# Patient Record
(demographics unavailable — no encounter records)

---

## 2025-04-10 NOTE — PLAN
[FreeTextEntry1] : We reviewed the risks and benefits of observation vs repair.  Discussed risks of lap vs open, robotic assisted inguinal hernia repair with mesh, including but not limited to bleeding, infection, recurrence, injury to nearby structures, and nerve pain. We reviewed the typical tari-op and post-op course including bloating, swelling, bruising and pain. I expect the patient will be able to go home the same day. All questions were answered, and the patient wishes to be scheduled for surgery.  For more information on surgery:  [email or web address]    We will ask for pre-op to clinic to assess him and determine if more w/u is needed regarding cardiac hx.

## 2025-04-10 NOTE — ASSESSMENT
[FreeTextEntry1] : Mr. Lozano has bilateral inguinal hernias Right > left., reducible. Today we discussed the treatment options including hernia repair versus watchful waiting.  The patient is interested in repair.  We reviewed options for laparoscopic, robotic assisted versus open repair.  We reviewed options for unilateral versus bilateral repair.  I have recommended lap robotic assisted mesh bilateral repair.  He has a history of open hernia repair as a baby though I could not see the scar. This may increase risk of some adhesions or need for open but not too likely.   I discussed the risks and benefits including, but not limited to, injury to intra-abdominal organs (bowel, bladder, nerves, spermatic cord/round ligament), bleeding, infection, use of mesh and recurrent hernia, and urinary retention. We discussed the role and complications of mesh at length. I also discussed the normal tari- and post-operative course and the possibility of postoperative chronic pain. I answered all questions about this surgery and possible complications to the best of my ability and the patient verbalized understanding. Should unrelenting symptoms or signs of incarceration develop prior to surgery the patient knows to call or go to the emergency room.

## 2025-04-10 NOTE — PHYSICAL EXAM
[JVD] : no jugular venous distention  [Respiratory Effort] : normal respiratory effort [No Rash or Lesion] : No rash or lesion [Alert] : alert [Oriented to Person] : oriented to person [Oriented to Place] : oriented to place [Oriented to Time] : oriented to time [Calm] : calm [de-identified] : well NAD [de-identified] : NCAT [de-identified] : soft, non distended, RIH, reducible no skin changes, small, left side with small palpable defect as well. [de-identified] : no c/c/e

## 2025-04-10 NOTE — REVIEW OF SYSTEMS
[TextEntry] : rare  frequency.  Constitutional, Eyes, ENT, Cardiovascular, Respiratory, Gastrointestinal, Genitourinary, Musculoskeletal, Integumentary, Neurological, Psychiatric, Endocrine and Heme/Lymph are otherwise negative.

## 2025-05-08 NOTE — ASSESSMENT
[Patient NOT optimized for Surgery at this time] : Patient not optimized for surgery at this time [No Further Testing Recommended] : no further testing recommended [Patient Optimized for Surgery] : Patient optimized for surgery [FreeTextEntry4] : 63 yrs old M with pmx of b/l inguinal hernia, ITP comes in for pre-op eval for ROBOTIC ASSISTED LAPAROSCOPIC BILATERAL INGUINAL HERNIA REPAIR WITH MESH RCRI 0 points class I risk. Moderate functional status Pt is low risk for low risk procedure. No interventions needed from medicine at this point of time.

## 2025-05-08 NOTE — HISTORY OF PRESENT ILLNESS
[No Pertinent Pulmonary History] : no history of asthma, COPD, sleep apnea, or smoking [(Patient denies any chest pain, claudication, dyspnea on exertion, orthopnea, palpitations or syncope)] : Patient denies any chest pain, claudication, dyspnea on exertion, orthopnea, palpitations or syncope [Moderate (4-6 METs)] : Moderate (4-6 METs) [No Pertinent Cardiac History] : no history of aortic stenosis, atrial fibrillation, coronary artery disease, recent myocardial infarction, or implantable device/pacemaker [No Adverse Anesthesia Reaction] : no adverse anesthesia reaction in self or family member [Chronic Anticoagulation] : no chronic anticoagulation [Chronic Kidney Disease] : no chronic kidney disease [Diabetes] : no diabetes [FreeTextEntry1] : ROBOTIC ASSISTED LAPAROSCOPIC BILATERAL INGUINAL HERNIA REPAIR WITH MESH [FreeTextEntry4] : 63 yrs old M with pmx of b/l inguinal hernia, ITP comes in for pre-op eval for ROBOTIC ASSISTED LAPAROSCOPIC BILATERAL INGUINAL HERNIA REPAIR WITH MESH No new complains.  Denies any chest pain, cough, fevers, vomiting, diarrhea, rash, joint pains, sob, palpitations. Moderate functional capacity: rides bike 25 miles twice a week without any problems.  No problems with anesthesia in the past. Denies any blood thinners or herbal meds use.